# Patient Record
Sex: MALE | Race: WHITE | Employment: OTHER | URBAN - METROPOLITAN AREA
[De-identification: names, ages, dates, MRNs, and addresses within clinical notes are randomized per-mention and may not be internally consistent; named-entity substitution may affect disease eponyms.]

---

## 2021-04-26 ENCOUNTER — HOSPITAL ENCOUNTER (OUTPATIENT)
Dept: NON INVASIVE DIAGNOSTICS | Age: 81
Discharge: HOME | End: 2021-04-26
Attending: INTERNAL MEDICINE
Payer: MEDICARE

## 2021-04-26 ENCOUNTER — TRANSCRIBE ORDER (OUTPATIENT)
Dept: SCHEDULING | Age: 81
End: 2021-04-26

## 2021-04-26 DIAGNOSIS — M79.669 CALF PAIN: ICD-10-CM

## 2021-04-26 DIAGNOSIS — M79.669 CALF PAIN: Primary | ICD-10-CM

## 2021-04-26 PROCEDURE — 93970 EXTREMITY STUDY: CPT

## 2021-04-30 ENCOUNTER — HOSPITAL ENCOUNTER (OUTPATIENT)
Dept: PREADMISSION TESTING | Age: 81
Discharge: HOME | End: 2021-04-30
Payer: MEDICARE

## 2021-04-30 VITALS
SYSTOLIC BLOOD PRESSURE: 161 MMHG | TEMPERATURE: 97.7 F | WEIGHT: 179.8 LBS | DIASTOLIC BLOOD PRESSURE: 79 MMHG | HEIGHT: 70 IN | RESPIRATION RATE: 18 BRPM | BODY MASS INDEX: 25.74 KG/M2 | HEART RATE: 66 BPM

## 2021-04-30 LAB
ALBUMIN SERPL-MCNC: 3.2 G/DL (ref 3.5–5)
ALBUMIN/GLOB SERPL: 0.9 {RATIO} (ref 1.1–2.2)
ALP SERPL-CCNC: 87 U/L (ref 45–117)
ALT SERPL-CCNC: 20 U/L (ref 12–78)
ANION GAP SERPL CALC-SCNC: 4 MMOL/L (ref 5–15)
APPEARANCE UR: ABNORMAL
APTT PPP: 33.6 SEC (ref 21.2–34.1)
AST SERPL W P-5'-P-CCNC: 12 U/L (ref 15–37)
BACTERIA URNS QL MICRO: NEGATIVE /HPF
BILIRUB SERPL-MCNC: 0.3 MG/DL (ref 0.2–1)
BILIRUB UR QL: NEGATIVE
BUN SERPL-MCNC: 33 MG/DL (ref 6–20)
BUN/CREAT SERPL: 18 (ref 12–20)
CA-I BLD-MCNC: 9.2 MG/DL (ref 8.5–10.1)
CHLORIDE SERPL-SCNC: 109 MMOL/L (ref 97–108)
CHOLEST SERPL-MCNC: 114 MG/DL
CO2 SERPL-SCNC: 25 MMOL/L (ref 21–32)
COLOR UR: ABNORMAL
CREAT SERPL-MCNC: 1.85 MG/DL (ref 0.7–1.3)
ERYTHROCYTE [DISTWIDTH] IN BLOOD BY AUTOMATED COUNT: 15.2 % (ref 11.5–14.5)
GLOBULIN SER CALC-MCNC: 3.5 G/DL (ref 2–4)
GLUCOSE SERPL-MCNC: 108 MG/DL (ref 65–100)
GLUCOSE UR STRIP.AUTO-MCNC: NEGATIVE MG/DL
HCT VFR BLD AUTO: 23.4 % (ref 36.6–50.3)
HDLC SERPL-MCNC: 35 MG/DL
HDLC SERPL: 3.3 {RATIO} (ref 0–5)
HGB BLD-MCNC: 6.9 G/DL (ref 12.1–17)
HGB UR QL STRIP: ABNORMAL
INR PPP: 1.1 (ref 0.9–1.1)
KETONES UR QL STRIP.AUTO: NEGATIVE MG/DL
LDLC SERPL CALC-MCNC: 54.6 MG/DL (ref 0–100)
LEUKOCYTE ESTERASE UR QL STRIP.AUTO: ABNORMAL
LIPID PROFILE,FLP: NORMAL
MCH RBC QN AUTO: 26.2 PG (ref 26–34)
MCHC RBC AUTO-ENTMCNC: 29.5 G/DL (ref 30–36.5)
MCV RBC AUTO: 89 FL (ref 80–99)
MUCOUS THREADS URNS QL MICRO: ABNORMAL /LPF
NITRITE UR QL STRIP.AUTO: NEGATIVE
NRBC # BLD: 0 K/UL (ref 0–0.01)
NRBC BLD-RTO: 0 PER 100 WBC
PH UR STRIP: 6 [PH] (ref 5–8)
PLATELET # BLD AUTO: 173 K/UL (ref 150–400)
PMV BLD AUTO: 10.6 FL (ref 8.9–12.9)
POTASSIUM SERPL-SCNC: 4 MMOL/L (ref 3.5–5.1)
PROT SERPL-MCNC: 6.7 G/DL (ref 6.4–8.2)
PROT UR STRIP-MCNC: NEGATIVE MG/DL
PROTHROMBIN TIME: 14.2 SEC (ref 11.9–14.7)
RBC # BLD AUTO: 2.63 M/UL (ref 4.1–5.7)
RBC #/AREA URNS HPF: ABNORMAL /HPF (ref 0–5)
SODIUM SERPL-SCNC: 138 MMOL/L (ref 136–145)
SP GR UR REFRACTOMETRY: 1.01 (ref 1–1.03)
THERAPEUTIC RANGE,PTTT: NORMAL SEC (ref 82–109)
TRIGL SERPL-MCNC: 122 MG/DL (ref ?–150)
UROBILINOGEN UR QL STRIP.AUTO: 0.1 EU/DL (ref 0.1–1)
VLDLC SERPL CALC-MCNC: 24.4 MG/DL
WBC # BLD AUTO: 5.3 K/UL (ref 4.1–11.1)
WBC URNS QL MICRO: ABNORMAL /HPF (ref 0–4)

## 2021-04-30 PROCEDURE — 85027 COMPLETE CBC AUTOMATED: CPT

## 2021-04-30 PROCEDURE — 85610 PROTHROMBIN TIME: CPT

## 2021-04-30 PROCEDURE — 80061 LIPID PANEL: CPT

## 2021-04-30 PROCEDURE — 36415 COLL VENOUS BLD VENIPUNCTURE: CPT

## 2021-04-30 PROCEDURE — 81001 URINALYSIS AUTO W/SCOPE: CPT

## 2021-04-30 PROCEDURE — 85730 THROMBOPLASTIN TIME PARTIAL: CPT

## 2021-04-30 PROCEDURE — 80053 COMPREHEN METABOLIC PANEL: CPT

## 2021-04-30 RX ORDER — SULFAMETHOXAZOLE AND TRIMETHOPRIM 800; 160 MG/1; MG/1
1 TABLET ORAL 2 TIMES DAILY
COMMUNITY
End: 2021-09-28 | Stop reason: ALTCHOICE

## 2021-04-30 NOTE — PROGRESS NOTES
Called Dr Miriam Del Rio office  left message regarding abnormal labs and for the office to please return call to PAT.    RBC 2.63  Low   4.10 - 5.70 M/uL Final   HGB 6.9  Low   12.1 - 17.0 g/dL Final         HCT 23.4  Low   36.6 - 50.3 % Final   MCV 89.0   80.0 - 99.0 FL Final   MCH 26.2   26.0 - 34.0 PG Final   MCHC 29.5  Low   30.0 - 36.5 g/dL Final   RDW 15.2  High   11.5 - 14.5 %      BUN 33  High   6 - 20 mg/dL Final   Creatinine 1.85  High   0.70 - 1.30 mg/dL Final

## 2021-05-03 ENCOUNTER — HOSPITAL ENCOUNTER (EMERGENCY)
Age: 81
Discharge: HOME | End: 2021-05-03
Attending: EMERGENCY MEDICINE
Payer: MEDICARE

## 2021-05-03 VITALS
RESPIRATION RATE: 13 BRPM | OXYGEN SATURATION: 97 % | TEMPERATURE: 98.3 F | DIASTOLIC BLOOD PRESSURE: 61 MMHG | WEIGHT: 179 LBS | HEIGHT: 70 IN | BODY MASS INDEX: 25.62 KG/M2 | HEART RATE: 64 BPM | SYSTOLIC BLOOD PRESSURE: 140 MMHG

## 2021-05-03 DIAGNOSIS — D64.9 SYMPTOMATIC ANEMIA: Primary | ICD-10-CM

## 2021-05-03 LAB
ALBUMIN SERPL-MCNC: 3.3 G/DL (ref 3.5–5)
ALBUMIN/GLOB SERPL: 0.9 {RATIO} (ref 1.1–2.2)
ALP SERPL-CCNC: 79 U/L (ref 45–117)
ALT SERPL-CCNC: 22 U/L (ref 12–78)
ANION GAP SERPL CALC-SCNC: 4 MMOL/L (ref 5–15)
APPEARANCE UR: ABNORMAL
APTT PPP: 28.4 SEC (ref 21.2–34.1)
AST SERPL W P-5'-P-CCNC: 10 U/L (ref 15–37)
BACTERIA URNS QL MICRO: NEGATIVE /HPF
BASOPHILS # BLD: 0 K/UL (ref 0–0.1)
BASOPHILS NFR BLD: 0 % (ref 0–1)
BILIRUB SERPL-MCNC: 0.3 MG/DL (ref 0.2–1)
BILIRUB UR QL: NEGATIVE
BNP SERPL-MCNC: 404 PG/ML
BUN SERPL-MCNC: 34 MG/DL (ref 6–20)
BUN/CREAT SERPL: 18 (ref 12–20)
CA-I BLD-MCNC: 8.6 MG/DL (ref 8.5–10.1)
CHLORIDE SERPL-SCNC: 110 MMOL/L (ref 97–108)
CO2 SERPL-SCNC: 27 MMOL/L (ref 21–32)
COLLECT DATE STL: 7
COLOR UR: ABNORMAL
CREAT SERPL-MCNC: 1.91 MG/DL (ref 0.7–1.3)
DIFFERENTIAL METHOD BLD: ABNORMAL
EOSINOPHIL # BLD: 0.1 K/UL (ref 0–0.4)
EOSINOPHIL NFR BLD: 2 % (ref 0–7)
ERYTHROCYTE [DISTWIDTH] IN BLOOD BY AUTOMATED COUNT: 15.5 % (ref 11.5–14.5)
GLOBULIN SER CALC-MCNC: 3.5 G/DL (ref 2–4)
GLUCOSE SERPL-MCNC: 111 MG/DL (ref 65–100)
GLUCOSE UR STRIP.AUTO-MCNC: NEGATIVE MG/DL
HCT VFR BLD AUTO: 24.1 % (ref 36.6–50.3)
HEMOCCULT SP1 STL QL: POSITIVE
HGB BLD-MCNC: 7 G/DL (ref 12.1–17)
HGB UR QL STRIP: ABNORMAL
IMM GRANULOCYTES # BLD AUTO: 0 K/UL (ref 0–0.04)
IMM GRANULOCYTES NFR BLD AUTO: 0 % (ref 0–0.5)
INR PPP: 1 (ref 0.9–1.1)
KETONES UR QL STRIP.AUTO: NEGATIVE MG/DL
LEUKOCYTE ESTERASE UR QL STRIP.AUTO: ABNORMAL
LYMPHOCYTES # BLD: 1 K/UL (ref 0.8–3.5)
LYMPHOCYTES NFR BLD: 15 % (ref 12–49)
MCH RBC QN AUTO: 26.2 PG (ref 26–34)
MCHC RBC AUTO-ENTMCNC: 29 G/DL (ref 30–36.5)
MCV RBC AUTO: 90.3 FL (ref 80–99)
MONOCYTES # BLD: 0.6 K/UL (ref 0–1)
MONOCYTES NFR BLD: 9 % (ref 5–13)
MUCOUS THREADS URNS QL MICRO: ABNORMAL /LPF
NEUTS SEG # BLD: 4.8 K/UL (ref 1.8–8)
NEUTS SEG NFR BLD: 74 % (ref 32–75)
NITRITE UR QL STRIP.AUTO: NEGATIVE
NRBC # BLD: 0 K/UL (ref 0–0.01)
NRBC BLD-RTO: 0 PER 100 WBC
OTHER URINE MICRO,5051: 5
OTHER,OTHU: ABNORMAL
PH UR STRIP: 6 [PH] (ref 5–8)
PLATELET # BLD AUTO: 258 K/UL (ref 150–400)
PMV BLD AUTO: 10.8 FL (ref 8.9–12.9)
POTASSIUM SERPL-SCNC: 4.2 MMOL/L (ref 3.5–5.1)
PROT SERPL-MCNC: 6.8 G/DL (ref 6.4–8.2)
PROT UR STRIP-MCNC: 30 MG/DL
PROTHROMBIN TIME: 13.5 SEC (ref 11.9–14.7)
RBC # BLD AUTO: 2.67 M/UL (ref 4.1–5.7)
RBC #/AREA URNS HPF: ABNORMAL /HPF (ref 0–5)
SODIUM SERPL-SCNC: 141 MMOL/L (ref 136–145)
SP GR UR REFRACTOMETRY: 1.02 (ref 1–1.03)
THERAPEUTIC RANGE,PTTT: NORMAL SEC (ref 82–109)
TROPONIN I SERPL-MCNC: <0.05 NG/ML
UA: UC IF INDICATED,UAUC: ABNORMAL
UROBILINOGEN UR QL STRIP.AUTO: 2 EU/DL (ref 0.1–1)
WBC # BLD AUTO: 6.5 K/UL (ref 4.1–11.1)
WBC URNS QL MICRO: >100 /HPF (ref 0–4)

## 2021-05-03 PROCEDURE — 86901 BLOOD TYPING SEROLOGIC RH(D): CPT

## 2021-05-03 PROCEDURE — 85730 THROMBOPLASTIN TIME PARTIAL: CPT

## 2021-05-03 PROCEDURE — 86923 COMPATIBILITY TEST ELECTRIC: CPT

## 2021-05-03 PROCEDURE — 99285 EMERGENCY DEPT VISIT HI MDM: CPT

## 2021-05-03 PROCEDURE — 80053 COMPREHEN METABOLIC PANEL: CPT

## 2021-05-03 PROCEDURE — 83880 ASSAY OF NATRIURETIC PEPTIDE: CPT

## 2021-05-03 PROCEDURE — P9016 RBC LEUKOCYTES REDUCED: HCPCS

## 2021-05-03 PROCEDURE — 85610 PROTHROMBIN TIME: CPT

## 2021-05-03 PROCEDURE — 81001 URINALYSIS AUTO W/SCOPE: CPT

## 2021-05-03 PROCEDURE — 36430 TRANSFUSION BLD/BLD COMPNT: CPT

## 2021-05-03 PROCEDURE — 36415 COLL VENOUS BLD VENIPUNCTURE: CPT

## 2021-05-03 PROCEDURE — 85025 COMPLETE CBC W/AUTO DIFF WBC: CPT

## 2021-05-03 PROCEDURE — 84484 ASSAY OF TROPONIN QUANT: CPT

## 2021-05-03 PROCEDURE — 82272 OCCULT BLD FECES 1-3 TESTS: CPT

## 2021-05-03 PROCEDURE — 87086 URINE CULTURE/COLONY COUNT: CPT

## 2021-05-03 RX ORDER — SODIUM CHLORIDE 9 MG/ML
250 INJECTION, SOLUTION INTRAVENOUS AS NEEDED
Status: DISCONTINUED | OUTPATIENT
Start: 2021-05-03 | End: 2021-05-04 | Stop reason: HOSPADM

## 2021-05-03 NOTE — ED TRIAGE NOTES
Scheduled for cardiac cath tomorrow, has been feeling weak, today found hgb to be in 6's. Dr. Javier Duran sent to ED for blood transfusion. Reports has had uti with small amount of blood in urine, denies black, tarry, or blood bms.

## 2021-05-03 NOTE — ED PROVIDER NOTES
HPI   Chief Complaint   Patient presents with    Anemia   70yoM hx CAD with 2 stents presents with anemia. Pt reports 3 weeks constant mild fatigue and SOB. When he ambulates his fatigue and SOB worsen to moderate intensity and he has intermittent mild chest pain. Pt was doing preop labs for cardiac cath when Dr. Toan Dutton called him today about hgb 6.9. Pt reports mild hematuria recently and being treated with ABX for UTI, currently hematuria resolving. He denies melena, BRBPR, abdominal pain, blood thinners other than asa 81mg. He reports 5 years ago he had anemia and was getting B12 injections but not recently.      Past Medical History:   Diagnosis Date    Adverse effect of anesthesia     pt states could feel discomfort during last cath    CAD (coronary artery disease)     Cancer ([address]     bladder cx 11 years ago    COVID-19     approx 3 months ago    GERD (gastroesophageal reflux disease)     High cholesterol     Hypertension     Ill-defined condition     riaz bladder       Past Surgical History:   Procedure Laterality Date    HX APPENDECTOMY      HX CIRCUMCISION      HX HEART CATHETERIZATION      2 stents    HX OTHER SURGICAL      cosmetic, eye lids, chin    HX UROLOGICAL      riaz bladder         Family History:   Problem Relation Age of Onset    Alzheimer Mother     Cancer Father        Social History     Socioeconomic History    Marital status:      Spouse name: Not on file    Number of children: Not on file    Years of education: Not on file    Highest education level: Not on file   Occupational History    Not on file   Social Needs    Financial resource strain: Not on file    Food insecurity     Worry: Not on file     Inability: Not on file    Transportation needs     Medical: Not on file     Non-medical: Not on file   Tobacco Use    Smoking status: Never Smoker    Smokeless tobacco: Never Used   Substance and Sexual Activity    Alcohol use: Never     Frequency: Never    Drug use: Never    Sexual activity: Not on file   Lifestyle    Physical activity     Days per week: Not on file     Minutes per session: Not on file    Stress: Not on file   Relationships    Social connections     Talks on phone: Not on file     Gets together: Not on file     Attends Faith service: Not on file     Active member of club or organization: Not on file     Attends meetings of clubs or organizations: Not on file     Relationship status: Not on file    Intimate partner violence     Fear of current or ex partner: Not on file     Emotionally abused: Not on file     Physically abused: Not on file     Forced sexual activity: Not on file   Other Topics Concern    Not on file   Social History Narrative    Not on file         ALLERGIES: Ciprofloxacin    Review of Systems   Constitutional: Positive for fatigue. Respiratory: Positive for shortness of breath. Cardiovascular: Positive for chest pain. Genitourinary: Positive for hematuria (resolving). All other systems reviewed and are negative. Vitals:    05/03/21 1835 05/03/21 1905 05/03/21 1939 05/03/21 2002   BP: (!) 147/68 (!) 148/68 (!) 151/52 (!) 145/68   Pulse:  77 72 70   Resp: 18 16 16 16   Temp: 98.3 °F (36.8 °C) 97.6 °F (36.4 °C) 97.7 °F (36.5 °C) 98.1 °F (36.7 °C)   SpO2: 99% 100% 97% 100%   Weight:       Height:                Physical Exam   Patient Vitals for the past 8 hrs:   Temp Pulse Resp BP SpO2   05/03/21 2002 98.1 °F (36.7 °C) 70 16 (!) 145/68 100 %   05/03/21 1939 97.7 °F (36.5 °C) 72 16 (!) 151/52 97 %   05/03/21 1905 97.6 °F (36.4 °C) 77 16 (!) 148/68 100 %   05/03/21 1835 98.3 °F (36.8 °C)  18 (!) 147/68 99 %   05/03/21 1817 98.2 °F (36.8 °C)  18 (!) 149/71 100 %   05/03/21 1802 98.2 °F (36.8 °C) 64  (!) 155/69 100 %   05/03/21 1343 98.3 °F (36.8 °C) 71 18 (!) 132/46 100 %        Nursing note and vitals reviewed. Constitutional: NAD. Head: Normocephalic and atraumatic. Mouth/Throat: Airway patent.  Moist mucous membranes. Eyes: EOMI. No scleral icterus. Pallor. Neck: Neck supple. Cardiovascular: Normal rate, regular rhythm. Normal heart sounds. No murmur, rub, or gallop. Good pulses throughout. Pulmonary/Chest: No respiratory distress. Clear to auscultation bilaterally. No wheezes, rales, or rhonchi. Abdominal/GI: BS normal, Soft, non-tender, non-distended. No rebound or guarding. Rectal exam chaperoned by [facility] ED tech, has small non-thrombosed non-bleeding hemorrhoid, stool is well formed and brown, no gross melena or blood. Musculoskeletal: No gross injuries or deformities. Neurological: Alert and oriented to person, place, and time. Cranial Nerves 2-12 intact. Moving all extremities. No gross deficits. Psych: Pleasant, cooperative. Skin: Skin is warm and dry. No rash noted. MDM   Ddx = GI bleed, B12 deficiency/pernicious anemia, myelodysplastic disease, coagulopathy, ACS, heart failure. No gross fecal blood but he does have heme occult blood. I urged him to be admitted to hospital for possible GI bleed but he declines saying he feels not so bad, he says he needs to go home to take care of his wife who has Alzheimer's. Patient says he will call his hematologist oncologist as well as GI specialist tomorrow for follow-up. He says he understands that he could die from bleeding and severe anemia. He says he understands when to return to ED (bleeding, worsening fatigue, chest pain, shortness of breath). He seems to have capacity to make his decisions. I let him know that he is welcome to return to ED at any point if he would like to be reassessed. Pt has Neobladder, he requested repeat UA despite improved UTI symptoms. UA showing small leuk esterase and small blood, but no bacteria. It is not very obvious a UTI considering he has a neobladder. Sent UCx. Discharged to follow-up with his hematology oncologist and GI doctor outpatient, discharged with return precautions.   Labs Reviewed   CBC WITH AUTOMATED DIFF - Abnormal; Notable for the following components:       Result Value    RBC 2.67 (*)     HGB 7.0 (*)     HCT 24.1 (*)     MCHC 29.0 (*)     RDW 15.5 (*)     All other components within normal limits   METABOLIC PANEL, COMPREHENSIVE - Abnormal; Notable for the following components:    Chloride 110 (*)     Anion gap 4 (*)     Glucose 111 (*)     BUN 34 (*)     Creatinine 1.91 (*)     GFR est AA 41 (*)     GFR est non-AA 34 (*)     AST (SGOT) 10 (*)     Albumin 3.3 (*)     A-G Ratio 0.9 (*)     All other components within normal limits   OCCULT BLOOD, STOOL - Abnormal; Notable for the following components:    Occult Blood,day 1 Positive (*)     All other components within normal limits   URINALYSIS W/ REFLEX CULTURE - Abnormal; Notable for the following components:    Appearance Turbid (*)     Protein 30 (*)     Blood Small (*)     Urobilinogen 2.0 (*)     Leukocyte Esterase Small (*)     UA:UC IF INDICATED Urine Culture Ordered (*)     WBC >100 (*)     All other components within normal limits   CULTURE, URINE   BNP   TROPONIN I   PROTHROMBIN TIME + INR   PTT   TYPE & SCREEN   RBC, ALLOCATE     No orders to display     Medications   0.9% sodium chloride infusion 250 mL (has no administration in time range)     INash MD, am  the first and primary ED provider for this patient.           Procedures

## 2021-05-04 LAB
ABO + RH BLD: NORMAL
BLD PROD TYP BPU: NORMAL
BLOOD GROUP ANTIBODIES SERPL: NEGATIVE
BPU ID: NORMAL
CROSSMATCH RESULT,%XM: NORMAL
SPECIMEN EXP DATE BLD: NORMAL
STATUS OF UNIT,%ST: NORMAL
TRANSFUSION STATUS PATIENT QL: NORMAL
UNIT DIVISION, %UDIV: 0

## 2021-05-05 LAB
BACTERIA SPEC CULT: NORMAL
SPECIAL REQUESTS,SREQ: NORMAL

## 2021-09-28 ENCOUNTER — OFFICE VISIT (OUTPATIENT)
Dept: ENT CLINIC | Age: 81
End: 2021-09-28
Payer: MEDICARE

## 2021-09-28 VITALS
TEMPERATURE: 97.8 F | DIASTOLIC BLOOD PRESSURE: 60 MMHG | SYSTOLIC BLOOD PRESSURE: 118 MMHG | OXYGEN SATURATION: 99 % | WEIGHT: 177 LBS | RESPIRATION RATE: 16 BRPM | HEIGHT: 70 IN | HEART RATE: 68 BPM | BODY MASS INDEX: 25.34 KG/M2

## 2021-09-28 DIAGNOSIS — H81.12 BPPV (BENIGN PAROXYSMAL POSITIONAL VERTIGO), LEFT: ICD-10-CM

## 2021-09-28 DIAGNOSIS — R42 DIZZINESS: Primary | ICD-10-CM

## 2021-09-28 DIAGNOSIS — H93.8X2 PLUGGED FEELING IN EAR, LEFT: ICD-10-CM

## 2021-09-28 PROCEDURE — G8536 NO DOC ELDER MAL SCRN: HCPCS | Performed by: OTOLARYNGOLOGY

## 2021-09-28 PROCEDURE — 99204 OFFICE O/P NEW MOD 45 MIN: CPT | Performed by: OTOLARYNGOLOGY

## 2021-09-28 PROCEDURE — G8510 SCR DEP NEG, NO PLAN REQD: HCPCS | Performed by: OTOLARYNGOLOGY

## 2021-09-28 PROCEDURE — G8427 DOCREV CUR MEDS BY ELIG CLIN: HCPCS | Performed by: OTOLARYNGOLOGY

## 2021-09-28 PROCEDURE — 1101F PT FALLS ASSESS-DOCD LE1/YR: CPT | Performed by: OTOLARYNGOLOGY

## 2021-09-28 PROCEDURE — 95992 CANALITH REPOSITIONING PROC: CPT | Performed by: OTOLARYNGOLOGY

## 2021-09-28 PROCEDURE — G8419 CALC BMI OUT NRM PARAM NOF/U: HCPCS | Performed by: OTOLARYNGOLOGY

## 2021-09-28 RX ORDER — LANOLIN ALCOHOL/MO/W.PET/CERES
400 CREAM (GRAM) TOPICAL DAILY
COMMUNITY

## 2021-09-28 RX ORDER — LANOLIN ALCOHOL/MO/W.PET/CERES
CREAM (GRAM) TOPICAL
COMMUNITY

## 2021-09-28 NOTE — PROGRESS NOTES
Visit Vitals  /60 (BP 1 Location: Left upper arm, BP Patient Position: Sitting, BP Cuff Size: Large adult)   Pulse 68   Temp 97.8 °F (36.6 °C) (Temporal)   Resp 16   Ht 5' 10\" (1.778 m)   Wt 177 lb (80.3 kg)   SpO2 99%   BMI 25.40 kg/m²     Chief Complaint   Patient presents with    New Patient     Patient c/o dizziness    Dizziness    Ear Fullness     Left ear feels plugged   1. Have you been to the ER, urgent care clinic since your last visit? Hospitalized since your last visit? No    2. Have you seen or consulted any other health care providers outside of the [address] [place] [address] since your last visit? Include any pap smears or colon screening.  No

## 2021-09-28 NOTE — PROGRESS NOTES
Otolaryngology-Head and Neck Surgery  New Patient Visit     Patient: Shanna Thomas  YOB: 1940  MRN: [id number]  Date of Service: 9/28/2021    Chief Complaint: Vertigo    History of Present Illness: Shanna Thomas is a 80y.o. year old male who presents today for discussion of vertigo. Notes a history of vertigo, with chronic dizziness, ongoing for years. Also describes chronic left ear plugging - feels like needs to pop ears    Lately dizziness has been worse  Sometimes describes as a lightheaded sensation   Othertimes feels like a movement, spinning    Denies positionality and unsure of triggers     Was seen by PCP and possibly diagnosed with positional vertigo    Daughter in law is PT at [address] seems to be ok, maybe worse in left ear       Past Medical History:  Past Medical History:   Diagnosis Date    Adverse effect of anesthesia     pt states could feel discomfort during last cath    CAD (coronary artery disease)     Cancer ([address]     bladder cx 11 years ago    COVID-19     approx 3 months ago    GERD (gastroesophageal reflux disease)     High cholesterol     Hypertension     Ill-defined condition     riaz bladder       Past Surgical History:   Past Surgical History:   Procedure Laterality Date    HX APPENDECTOMY      HX CIRCUMCISION      HX HEART CATHETERIZATION      2 stents    HX OTHER SURGICAL      cosmetic, eye lids, chin    HX UROLOGICAL      riaz bladder       Medications:   Current Outpatient Medications   Medication Instructions    aspirin delayed-release 81 mg tablet Oral, DAILY    ferrous sulfate (Iron) 325 mg (65 mg iron) tablet Oral, DAILY BEFORE BREAKFAST    folic acid 549 mcg, Oral, DAILY    metoprolol succinate (TOPROL-XL) 25 mg XL tablet Oral, DAILY    multivitamin (ONE A DAY) tablet 1 Tablet, Oral, DAILY    simvastatin (ZOCOR) 20 mg tablet Oral, EVERY BEDTIME       Allergies:    Allergies   Allergen Reactions    Ciprofloxacin Other (comments) Blood in urine    Penicillins Hives       Social History:   Social History     Tobacco Use    Smoking status: Never Smoker    Smokeless tobacco: Never Used   Vaping Use    Vaping Use: Never used   Substance Use Topics    Alcohol use: Never    Drug use: Never        Family History:  Family History   Problem Relation Age of Onset    Alzheimer Mother     Cancer Father        Review of Systems:    Consitutional: denies fever, excessive weight gain or loss. Eyes: denies diplopia, eye pain. Integumentary: denies new concerning skin lesions. Ears, Nose, Mouth, Throat: denies except as per HPI. Endocrine: denies hot or cold intolerance, increased thirst.  Respiratory: denies cough, hemoptysis, wheezing  Gastrointestinal: denies trouble swallowing, nausea, emesis, regurgitation  Musculoskeletal: denies muscle weakness or wasting  Cardiovascular: denies chest pain, shortness of breath  Neurologic: denies seizures, numbness or tingling, syncope  Hematologic: denies easy bleeding or bruising    Physical Examination:   Vitals:    09/28/21 1316   BP: 118/60   BP 1 Location: Left upper arm   BP Patient Position: Sitting   BP Cuff Size: Large adult   Pulse: 68   Temp: 97.8 °F (36.6 °C)   TempSrc: Temporal   Resp: 16   Height: 5' 10\" (1.778 m)   Weight: 177 lb (80.3 kg)   SpO2: 99%        General: Comfortable, pleasant, appears stated age  Voice: Strong, speaking in full sentences, no stridor    Face: No masses or lesions, facial strength symmetric   Ears: External ears unremarkable. Bilateral ear canal clear. Tympanic membrane clear and intact, with visible landmarks. Clear middle ear space  Nose: External nose unremarkable. Dorsum midline. Anterior rhinoscopy demonstrates no lesions. Septum midline. Turbinates without hypertrophy. Oral Cavity / Oropharynx: No trismus. Mucosa pink and moist. No lesions. Tongue is midline and mobile. Palate elevates symmetrically. Uvula midline. Tonsils unremarkable.  Base of tongue soft. Floor of mouth soft. Neck: Supple. No adenopathy. Thyroid unremarkable. Palpable laryngeal landmarks. Full neck range of motion   Neurologic: CN II - XI intact. Normal gait. No spontaneous or gaze induced nystagmus. Subjective dizziness with jhonatan hallpike to the left, without sheron nystagmus    PROCEDURE: Epley maneuver     After the patient was noted to have a positive Epley maneuver to the left, he was kept in this position for about 45 seconds or until the nystagmus resolved. His head was then turned to the opposite direction. Similarly she was kept in this position for 45 seconds. Her head was then turned onto his right side with head facing down. Upon cessation of the nystagmus, he then sat up with head facing right. He was then repositioned in the chair. Assessment and Plan:   1. Dizziness  2. Left ear plugging  3. Possible left BPPV  - History is not fully clear, though he does have some subjective dizziness with [place] only to the left and he does appear to lateralize his symptoms to the left  - With this in mind, we did an epley maneuver just in case  - Sleep slightly upright and avoid sudden headmovements  - Home Nakita Hatch exercises provided to patient  - He will also discuss with his daughter in law additional exercises that perhaps she can help him with  - Will arrange vestibular therapy if he wants to trial therapy formally  - Follow up in 1 month, will likely plan audiogram if not improving         The patient was instructed to return to clinic if no improvement or progression of symptoms. Signs to watch out for reviewed.       MD Jorge Cash [facility]  [address] [place] [address]  Office Phone: [phone]

## 2021-09-28 NOTE — LETTER
9/29/2021    Patient: Freda Vazquez   YOB: 1940   Date of Visit: 9/28/2021     Fernando Hadley MD  Via [address] [address]  Via Fax: [phone]    Dear Fernando Hadley MD,      Thank you for referring Mr. Omar Astorga to [facility] EAR NOSE AND THROAT [address] for evaluation. My notes for this consultation are attached. If you have questions, please do not hesitate to call me. I look forward to following your patient along with you.       Sincerely,    Trace Broussard MD

## 2021-10-26 ENCOUNTER — OFFICE VISIT (OUTPATIENT)
Dept: ENT CLINIC | Age: 81
End: 2021-10-26

## 2021-10-26 ENCOUNTER — OFFICE VISIT (OUTPATIENT)
Dept: ENT CLINIC | Age: 81
End: 2021-10-26
Payer: MEDICARE

## 2021-10-26 VITALS
TEMPERATURE: 97.2 F | RESPIRATION RATE: 20 BRPM | HEIGHT: 70 IN | SYSTOLIC BLOOD PRESSURE: 140 MMHG | OXYGEN SATURATION: 98 % | WEIGHT: 175 LBS | HEART RATE: 64 BPM | BODY MASS INDEX: 25.05 KG/M2 | DIASTOLIC BLOOD PRESSURE: 70 MMHG

## 2021-10-26 DIAGNOSIS — H90.3 SENSORINEURAL HEARING LOSS (SNHL) OF BOTH EARS: ICD-10-CM

## 2021-10-26 DIAGNOSIS — H81.12 BPPV (BENIGN PAROXYSMAL POSITIONAL VERTIGO), LEFT: ICD-10-CM

## 2021-10-26 DIAGNOSIS — R42 DIZZINESS: Primary | ICD-10-CM

## 2021-10-26 DIAGNOSIS — H93.8X2 PLUGGED FEELING IN EAR, LEFT: ICD-10-CM

## 2021-10-26 DIAGNOSIS — H90.3 ASYMMETRIC SNHL (SENSORINEURAL HEARING LOSS): ICD-10-CM

## 2021-10-26 PROCEDURE — G8419 CALC BMI OUT NRM PARAM NOF/U: HCPCS | Performed by: OTOLARYNGOLOGY

## 2021-10-26 PROCEDURE — G8432 DEP SCR NOT DOC, RNG: HCPCS | Performed by: OTOLARYNGOLOGY

## 2021-10-26 PROCEDURE — 92557 COMPREHENSIVE HEARING TEST: CPT | Performed by: AUDIOLOGIST

## 2021-10-26 PROCEDURE — 1101F PT FALLS ASSESS-DOCD LE1/YR: CPT | Performed by: OTOLARYNGOLOGY

## 2021-10-26 PROCEDURE — 92567 TYMPANOMETRY: CPT | Performed by: AUDIOLOGIST

## 2021-10-26 PROCEDURE — G8536 NO DOC ELDER MAL SCRN: HCPCS | Performed by: OTOLARYNGOLOGY

## 2021-10-26 PROCEDURE — G8427 DOCREV CUR MEDS BY ELIG CLIN: HCPCS | Performed by: OTOLARYNGOLOGY

## 2021-10-26 PROCEDURE — 99214 OFFICE O/P EST MOD 30 MIN: CPT | Performed by: OTOLARYNGOLOGY

## 2021-10-26 NOTE — PROGRESS NOTES
Shell Taylor, a 80y.o. year old male, was seen in ENT clinic today for a hearing evaluation on referral from Dr. Geovanni Casiano. Patient complains of dizziness and left-sided fullness (ongoing for years). He was previously seen by ENT on 9- for suspected BPPV; Epley maneuver was performed and patient was referred to physical therapy. He reports today that he is still getting dizzy and that his left ear remains plugged. There is some concern for hearing loss; patient reports he \"needs the TV up louder. \" No recent audiogram. Patient denies ear pain and tinnitus. History of occupational noise exposure was denied. Otoscopy: normal external ear canals and visible tympanic membranes, bilaterally. Tympanometry: RE Type As, shallow  LE Type As, shallow    SRT: RE Speech Reception Threshold (SRT) was obtained at 10 dBHL LE Speech Reception Threshold (SRT) was obtained at 15 dBHL    WRS: RE Excellent in quiet when words were presented at 50 dBHL. WRS: LE Excellent in quiet when words were presented at 55 dBHL. Pure tone audiometry:  RE: WNL sloping to mild sensorineural hearing loss at 4000Hz, rising to borderline normal  LE: WNL sloping to moderate-severe sensorineural hearing loss    Sensorineural hearing loss, bilaterally. Asymmetry noted (L>R) at 8000Hz. Impressions:  hearing loss requiring medical/otologic and audiologic follow-up  Discussed results of today's testing with patient. Patient's limited mobility of the tympanic membrane as well as the high-frequency hearing loss may be contributing to his feeling \"plugged\" in the left ear, given hearing loss is greater in the left ear. Did discuss that patient could consider hearing aids if he feels he is having consistent trouble hearing; patient would like to defer for now in favor of managing his dizziness. Recommended follow-up with ENT, with repeat audiogram in 1 year or sooner if change is noted. Plan:  Follow-up with ENT.   Repeat audiogram upon request.  Hearing aid evaluation upon request.    Magnolia Hodgkins, AuD   Doctor of Audiology

## 2021-10-26 NOTE — LETTER
10/27/2021    Patient: Dena Alberts   YOB: 1940   Date of Visit: 10/26/2021     Paola Ling MD  Via [address] [address]  Via Fax: [phone]    Dear Paola Ling MD,      Thank you for referring Mr. Boy Smith to [facility] EAR NOSE AND THROAT [address] for evaluation. My notes for this consultation are attached. If you have questions, please do not hesitate to call me. I look forward to following your patient along with you.       Sincerely,    Josh Rodriguez MD

## 2021-10-26 NOTE — PROGRESS NOTES
Visit Vitals  BP (!) 140/70 (BP 1 Location: Left upper arm, BP Patient Position: Sitting, BP Cuff Size: Large adult)   Pulse 64   Temp 97.2 °F (36.2 °C) (Temporal)   Resp 20   Ht 5' 10\" (1.778 m)   Wt 175 lb (79.4 kg)   SpO2 98%   BMI 25.11 kg/m²     Chief Complaint   Patient presents with    Follow-up     Left ear fullness and dizziness, BPPV   1. Have you been to the ER, urgent care clinic since your last visit? Hospitalized since your last visit? No    2. Have you seen or consulted any other health care providers outside of the [address] [place] [address] since your last visit? Include any pap smears or colon screening. Yes Reason for visit: Physical Therapy past 3 weeks.

## 2021-10-27 NOTE — PROGRESS NOTES
Otolaryngology-Head and Neck Surgery  Follow Up Patient Visit     Patient: Singh Ayoub  YOB: 1940  MRN: [id number]  Date of Service: 10/26/2021    Chief Complaint: Vertigo    Interval hx:  No issues since LOV  Did a few sessions of therapy  Feels he's doing better      History of Present Illness: Singh Ayoub is a 80y.o. year old male who presents today for discussion of vertigo. Notes a history of vertigo, with chronic dizziness, ongoing for years.  Also describes chronic left ear plugging - feels like needs to pop ears    Lately dizziness has been worse  Sometimes describes as a lightheaded sensation   Othertimes feels like a movement, spinning    Denies positionality and unsure of triggers     Was seen by PCP and possibly diagnosed with positional vertigo    Daughter in law is PT at [address] seems to be ok, maybe worse in left ear       Past Medical History:  Past Medical History:   Diagnosis Date    Adverse effect of anesthesia     pt states could feel discomfort during last cath    CAD (coronary artery disease)     Cancer ([address]     bladder cx 11 years ago    COVID-19     approx 3 months ago    GERD (gastroesophageal reflux disease)     High cholesterol     Hypertension     Ill-defined condition     riaz bladder       Past Surgical History:   Past Surgical History:   Procedure Laterality Date    HX APPENDECTOMY      HX CIRCUMCISION      HX HEART CATHETERIZATION      2 stents    HX OTHER SURGICAL      cosmetic, eye lids, chin    HX UROLOGICAL      riaz bladder       Medications:   Current Outpatient Medications   Medication Instructions    aspirin delayed-release 81 mg tablet Oral, DAILY    ferrous sulfate (Iron) 325 mg (65 mg iron) tablet Oral, DAILY BEFORE BREAKFAST    folic acid 932 mcg, Oral, DAILY    metoprolol succinate (TOPROL-XL) 25 mg XL tablet Oral, DAILY    multivitamin (ONE A DAY) tablet 1 Tablet, Oral, DAILY    simvastatin (ZOCOR) 20 mg tablet Oral, EVERY BEDTIME       Allergies: Allergies   Allergen Reactions    Ciprofloxacin Other (comments)     Blood in urine    Penicillins Hives       Social History:   Social History     Tobacco Use    Smoking status: Never Smoker    Smokeless tobacco: Never Used   Vaping Use    Vaping Use: Never used   Substance Use Topics    Alcohol use: Never    Drug use: Never        Family History:  Family History   Problem Relation Age of Onset    Alzheimer Mother     Cancer Father        Review of Systems:    Consitutional: denies fever, excessive weight gain or loss. Eyes: denies diplopia, eye pain. Integumentary: denies new concerning skin lesions. Ears, Nose, Mouth, Throat: denies except as per HPI. Endocrine: denies hot or cold intolerance, increased thirst.  Respiratory: denies cough, hemoptysis, wheezing  Gastrointestinal: denies trouble swallowing, nausea, emesis, regurgitation  Musculoskeletal: denies muscle weakness or wasting  Cardiovascular: denies chest pain, shortness of breath  Neurologic: denies seizures, numbness or tingling, syncope  Hematologic: denies easy bleeding or bruising    Physical Examination:   Vitals:    10/26/21 1319   BP: (!) 140/70   BP 1 Location: Left upper arm   BP Patient Position: Sitting   BP Cuff Size: Large adult   Pulse: 64   Temp: 97.2 °F (36.2 °C)   TempSrc: Temporal   Resp: 20   Height: 5' 10\" (1.778 m)   Weight: 175 lb (79.4 kg)   SpO2: 98%        General: Comfortable, pleasant, appears stated age  Voice: Strong, speaking in full sentences, no stridor    Face: No masses or lesions, facial strength symmetric   Ears: External ears unremarkable. Bilateral ear canal clear. Tympanic membrane clear and intact, with visible landmarks. Clear middle ear space  Nose: External nose unremarkable. Dorsum midline. Anterior rhinoscopy demonstrates no lesions. Septum midline. Turbinates without hypertrophy. Oral Cavity / Oropharynx: No trismus. Mucosa pink and moist. No lesions. Tongue is midline and mobile. Palate elevates symmetrically. Uvula midline. Tonsils unremarkable. Base of tongue soft. Floor of mouth soft. Neck: Supple. No adenopathy. Thyroid unremarkable. Palpable laryngeal landmarks. Full neck range of motion   Neurologic: CN II - XI intact. Normal gait. No spontaneous or gaze induced nystagmus. Assessment and Plan:   1. Dizziness  2. Bilateral SNHL  3. L ear plugging   4. Possible left BPPV  - History is not fully clear, though he does have some subjective dizziness with Devoria Kyara only to the left and he does appear to lateralize his symptoms to the left  - Has had a few sessions with April, vestibular therapy, which he has found to be helpful  - He would like to do a few more sessions - will refer  - Has had recent appt with cardiology + PCP as well in terms of dizziness   - Discussed audiogram results as well, bilateral HF SNHL - mild asymmetry on the left  - Would otherwise favor probably just repeat audiogram to follow asymmetry in 6-12 months, however with his ongoing dizziness, could consider MRI IAC/brain as well  - He will consider this but for now would rather hold off on the MRI   - As he's feeling better I think this is reasonable  - If worsening of symptoms, we can plan this as next steps  - Signs to watch out for reviewed  - Otherwise follow up 6 mo for repeat audio        The patient was instructed to return to clinic if no improvement or progression of symptoms. Signs to watch out for reviewed.       MD Jorge Tim [facility]  [address] [place] [address]  Office Phone: [phone]

## 2023-06-09 ENCOUNTER — OFFICE VISIT (OUTPATIENT)
Age: 83
End: 2023-06-09

## 2023-06-09 VITALS
RESPIRATION RATE: 16 BRPM | WEIGHT: 177 LBS | BODY MASS INDEX: 25.34 KG/M2 | HEIGHT: 70 IN | DIASTOLIC BLOOD PRESSURE: 60 MMHG | HEART RATE: 57 BPM | OXYGEN SATURATION: 99 % | SYSTOLIC BLOOD PRESSURE: 117 MMHG

## 2023-06-09 DIAGNOSIS — H81.12 BENIGN PAROXYSMAL VERTIGO, LEFT EAR: ICD-10-CM

## 2023-06-09 DIAGNOSIS — R42 DIZZINESS AND GIDDINESS: ICD-10-CM

## 2023-06-09 DIAGNOSIS — H90.3 SENSORINEURAL HEARING LOSS, BILATERAL: Primary | ICD-10-CM

## 2023-06-09 NOTE — PROGRESS NOTES
Chief Complaint   Patient presents with    Dizziness     F/U from 6 months ago     /60   Pulse 57   Resp 16   Ht 5' 10\" (1.778 m)   Wt 177 lb (80.3 kg)   SpO2 99%   BMI 25.40 kg/m²   1. Have you been to the ER, urgent care clinic since your last visit? Hospitalized since your last visit? No    2. Have you seen or consulted any other health care providers outside of the [address] [place] [address] since your last visit? Include any pap smears or colon screening.  No
asymmetry , however with his ongoing dizziness, will also send for MRI IAC/brain as well   - If worsening of symptoms, we can plan this as next steps   - Signs to watch out for reviewed              The patient was instructed to return to clinic if no improvement or progression of symptoms. Signs to watch out for reviewed. Nawaf Meek.  ETELVINA Lux, AGACNP-BC, [address] [place] [address] ENT & Allergy   [address] [place] [address],[address]   [facility]   Office Phone: [phone]

## 2023-07-17 ENCOUNTER — HOSPITAL ENCOUNTER (OUTPATIENT)
Facility: HOSPITAL | Age: 83
Discharge: HOME | End: 2023-07-20
Payer: MEDICARE

## 2023-07-17 DIAGNOSIS — H90.3 SENSORINEURAL HEARING LOSS, BILATERAL: ICD-10-CM

## 2023-07-17 DIAGNOSIS — R42 DIZZINESS AND GIDDINESS: ICD-10-CM

## 2023-07-17 PROCEDURE — 70552 MRI BRAIN STEM W/DYE: CPT

## 2023-07-20 ENCOUNTER — TELEPHONE (OUTPATIENT)
Age: 83
End: 2023-07-20

## 2023-07-20 DIAGNOSIS — C71.9 GLIOMA (HCC): Primary | ICD-10-CM

## 2023-07-20 NOTE — PROGRESS NOTES
Referral sent to Dr. Donald Shafer from neurosurgery to assess MRI and possibility of glioma. Jose Pfeiffer.  ETELVINA Hernandez, [facility]-BC, [address] [place] [address] ENT and Allergy  [phone]

## 2023-07-20 NOTE — TELEPHONE ENCOUNTER
Called to inform patient of MRI results. Oswaldo Diaz.  ETELVINA Chua, AGACNP-BC, [address] [place] [address] ENT and Allergy  [phone]

## 2023-07-24 ENCOUNTER — OFFICE VISIT (OUTPATIENT)
Age: 83
End: 2023-07-24
Payer: MEDICARE

## 2023-07-24 VITALS
DIASTOLIC BLOOD PRESSURE: 78 MMHG | OXYGEN SATURATION: 98 % | HEART RATE: 68 BPM | BODY MASS INDEX: 25.34 KG/M2 | RESPIRATION RATE: 18 BRPM | SYSTOLIC BLOOD PRESSURE: 130 MMHG | HEIGHT: 70 IN | WEIGHT: 177 LBS

## 2023-07-24 DIAGNOSIS — H90.3 ASYMMETRIC SNHL (SENSORINEURAL HEARING LOSS): ICD-10-CM

## 2023-07-24 DIAGNOSIS — H90.3 SENSORINEURAL HEARING LOSS, BILATERAL: Primary | ICD-10-CM

## 2023-07-24 DIAGNOSIS — G93.89 CEREBELLAR MASS: ICD-10-CM

## 2023-07-24 DIAGNOSIS — R42 DIZZINESS: Primary | ICD-10-CM

## 2023-07-24 PROCEDURE — 92557 COMPREHENSIVE HEARING TEST: CPT | Performed by: AUDIOLOGIST

## 2023-07-24 PROCEDURE — G8419 CALC BMI OUT NRM PARAM NOF/U: HCPCS | Performed by: OTOLARYNGOLOGY

## 2023-07-24 PROCEDURE — G8427 DOCREV CUR MEDS BY ELIG CLIN: HCPCS | Performed by: OTOLARYNGOLOGY

## 2023-07-24 PROCEDURE — 99213 OFFICE O/P EST LOW 20 MIN: CPT | Performed by: OTOLARYNGOLOGY

## 2023-07-24 PROCEDURE — 1123F ACP DISCUSS/DSCN MKR DOCD: CPT | Performed by: OTOLARYNGOLOGY

## 2023-07-24 PROCEDURE — 1036F TOBACCO NON-USER: CPT | Performed by: OTOLARYNGOLOGY

## 2023-07-24 PROCEDURE — 92567 TYMPANOMETRY: CPT | Performed by: AUDIOLOGIST

## 2023-07-24 NOTE — PROGRESS NOTES
Misha Felix, a 82y.o. year old male, was seen in clinic today for a hearing evaluation on referral from [facility]. Patient complains of hearing loss and dizziness. Patient was last seen for audiogram 10-. At that time, asymmetry was noted L>R for 8000Hz only. Due to patient's reports of dizziness, he was referred for vestibular therapy which was somewhat helpful. At that time, patient had preferred serial audiogram over MRI. Patient reported on 6-9-2023 that he was still feeling dizzy; MRI was ordered at that time and a lesion was noted in the left cerebellar peduncle. Patient is scheduled today for audiogram and ENT followup. Otoscopy: normal external ear canals and visible tympanic membranes, bilaterally. Tympanometry: RE Type As, shallow  LE Type As, shallow    SRT: RE Speech Reception Threshold (SRT) was obtained at 15 dBHL   LE Speech Reception Threshold (SRT) was obtained at 10 dBHL    WRS: RE Excellent in quiet when words were presented at 55 dBHL. WRS: LE Excellent in quiet when words were presented at 50 dBHL. Pure tone audiometry:  RE: WNL/borderline (mild sensorineural notch at 4000Hz)  LE: WNL sloping to moderate sensorineural hearing loss through 6000Hz, steeply sloping to moderate-severe     sensorineural hearing loss bilaterally (asymmetry noted 8000Hz only, L>R)    Pure tone thresholds are consistent with previous audiogram 10-. Impressions:  hearing loss requiring medical/otologic and audiologic follow-up    Plan:  Follow-up with NP. Hearing aid evaluation recommended. Repeat audiogram 6 months to monitor asymmetry.      Gemini Rosales   Doctor of Audiology

## 2023-07-24 NOTE — PROGRESS NOTES
Otolaryngology-Head and Neck Surgery  Follow Up Patient Visit     Patient: Praveena Benitez  YOB: 1940  MRN: [id number]  Date of Service:  7/24/2023    Chief Complaint: Follow up audiogram    History of Present Illness: Praveena Benitez is a 82 y.o. male who was last seen nearly 2 years ago with dizziness, and minimal asymmetric hearing loss. At that time his dizziness was improving and we discussed MRI but elected for observation    He's recently had worsening dizziness and saw [place] NP who arranged MRI and follow up audiogram    Past Medical History:  Past Medical History:   Diagnosis Date    Adverse effect of anesthesia     pt states could feel discomfort during last cath    CAD (coronary artery disease)     Cancer ([address])     bladder cx 11 years ago    COVID-19     approx 3 months ago    GERD (gastroesophageal reflux disease)     High cholesterol     Hypertension     Ill-defined condition     abram bladder       Past Surgical History:   Past Surgical History:   Procedure Laterality Date    APPENDECTOMY      CARDIAC CATHETERIZATION      2 stents    CIRCUMCISION      OTHER SURGICAL HISTORY      cosmetic, eye lids, chin    UROLOGICAL SURGERY      abram bladder       Medications:   Current Outpatient Medications   Medication Instructions    aspirin 81 MG EC tablet Oral, DAILY    ferrous sulfate (IRON 325) 325 (65 Fe) MG tablet Oral, DAILY BEFORE BREAKFAST    folic acid (FOLVITE) 924 mcg, Oral, DAILY    metoprolol succinate (TOPROL XL) 25 MG extended release tablet Oral, DAILY    simvastatin (ZOCOR) 20 MG tablet Oral       Allergies:    Allergies   Allergen Reactions    Ciprofloxacin Other (See Comments)     Blood in urine    Penicillins Hives       Social History:   Social History     Tobacco Use    Smoking status: Never    Smokeless tobacco: Never   Substance Use Topics    Alcohol use: Never    Drug use: Never       Family History:  Family History   Problem Relation Age of Onset    Alzheimer's Disease Mother

## 2023-07-26 ENCOUNTER — TELEPHONE (OUTPATIENT)
Age: 83
End: 2023-07-26

## 2023-07-26 DIAGNOSIS — G93.89 CEREBELLAR MASS: Primary | ICD-10-CM

## 2023-07-26 NOTE — TELEPHONE ENCOUNTER
Pt came in asking if Dr. Yu Guajardo could send a referral to [facility].  The address for the facility that the pt provided is:    [facility] [address] [place] [address]    Phone number: [phone]  Fax number: [phone]